# Patient Record
Sex: MALE | Race: OTHER | NOT HISPANIC OR LATINO | URBAN - METROPOLITAN AREA
[De-identification: names, ages, dates, MRNs, and addresses within clinical notes are randomized per-mention and may not be internally consistent; named-entity substitution may affect disease eponyms.]

---
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
## 2023-10-05 NOTE — ED PROVIDER NOTE - CROS ED RESP ALL NEG
negative - no cough [place] Pregnancy And Lactation Text: The risk during pregnancy and breastfeeding is uncertain with this medication.
